# Patient Record
Sex: FEMALE | Employment: PART TIME | URBAN - METROPOLITAN AREA
[De-identification: names, ages, dates, MRNs, and addresses within clinical notes are randomized per-mention and may not be internally consistent; named-entity substitution may affect disease eponyms.]

---

## 2017-08-04 ENCOUNTER — HOSPITAL ENCOUNTER (EMERGENCY)
Facility: CLINIC | Age: 32
Discharge: HOME | End: 2017-08-04
Attending: EMERGENCY MEDICINE | Admitting: EMERGENCY MEDICINE

## 2017-08-04 VITALS
WEIGHT: 137 LBS | DIASTOLIC BLOOD PRESSURE: 72 MMHG | HEIGHT: 66 IN | RESPIRATION RATE: 18 BRPM | BODY MASS INDEX: 22.02 KG/M2 | TEMPERATURE: 97.8 F | HEART RATE: 72 BPM | SYSTOLIC BLOOD PRESSURE: 105 MMHG | OXYGEN SATURATION: 99 %

## 2017-08-04 DIAGNOSIS — Z91.148 NONCOMPLIANCE WITH MEDICATION REGIMEN: ICD-10-CM

## 2017-08-04 DIAGNOSIS — R56.9 SEIZURES (H): ICD-10-CM

## 2017-08-04 LAB
ANION GAP SERPL CALCULATED.3IONS-SCNC: 9 MMOL/L (ref 3–14)
BASOPHILS # BLD AUTO: 0 10E9/L (ref 0–0.2)
BASOPHILS NFR BLD AUTO: 0.2 %
BUN SERPL-MCNC: 11 MG/DL (ref 7–30)
CALCIUM SERPL-MCNC: 8.3 MG/DL (ref 8.5–10.1)
CHLORIDE SERPL-SCNC: 107 MMOL/L (ref 94–109)
CO2 SERPL-SCNC: 23 MMOL/L (ref 20–32)
CREAT SERPL-MCNC: 0.74 MG/DL (ref 0.52–1.04)
DIFFERENTIAL METHOD BLD: ABNORMAL
EOSINOPHIL # BLD AUTO: 0.1 10E9/L (ref 0–0.7)
EOSINOPHIL NFR BLD AUTO: 1.1 %
ERYTHROCYTE [DISTWIDTH] IN BLOOD BY AUTOMATED COUNT: 12.4 % (ref 10–15)
GFR SERPL CREATININE-BSD FRML MDRD: ABNORMAL ML/MIN/1.7M2
GLUCOSE BLDC GLUCOMTR-MCNC: 98 MG/DL (ref 70–99)
GLUCOSE SERPL-MCNC: 87 MG/DL (ref 70–99)
HCT VFR BLD AUTO: 35.5 % (ref 35–47)
HGB BLD-MCNC: 11.6 G/DL (ref 11.7–15.7)
IMM GRANULOCYTES # BLD: 0 10E9/L (ref 0–0.4)
IMM GRANULOCYTES NFR BLD: 0.2 %
LYMPHOCYTES # BLD AUTO: 1.8 10E9/L (ref 0.8–5.3)
LYMPHOCYTES NFR BLD AUTO: 40.4 %
MCH RBC QN AUTO: 28 PG (ref 26.5–33)
MCHC RBC AUTO-ENTMCNC: 32.7 G/DL (ref 31.5–36.5)
MCV RBC AUTO: 86 FL (ref 78–100)
MONOCYTES # BLD AUTO: 0.4 10E9/L (ref 0–1.3)
MONOCYTES NFR BLD AUTO: 9.2 %
NEUTROPHILS # BLD AUTO: 2.2 10E9/L (ref 1.6–8.3)
NEUTROPHILS NFR BLD AUTO: 48.9 %
NRBC # BLD AUTO: 0 10*3/UL
NRBC BLD AUTO-RTO: 0 /100
PLATELET # BLD AUTO: 182 10E9/L (ref 150–450)
POTASSIUM SERPL-SCNC: 3.7 MMOL/L (ref 3.4–5.3)
RBC # BLD AUTO: 4.15 10E12/L (ref 3.8–5.2)
SODIUM SERPL-SCNC: 139 MMOL/L (ref 133–144)
VALPROATE SERPL-MCNC: ABNORMAL MG/L (ref 50–100)
WBC # BLD AUTO: 4.5 10E9/L (ref 4–11)

## 2017-08-04 PROCEDURE — 96374 THER/PROPH/DIAG INJ IV PUSH: CPT

## 2017-08-04 PROCEDURE — 00000146 ZZHCL STATISTIC GLUCOSE BY METER IP

## 2017-08-04 PROCEDURE — 96375 TX/PRO/DX INJ NEW DRUG ADDON: CPT

## 2017-08-04 PROCEDURE — 99284 EMERGENCY DEPT VISIT MOD MDM: CPT | Mod: 25

## 2017-08-04 PROCEDURE — 25000128 H RX IP 250 OP 636: Performed by: EMERGENCY MEDICINE

## 2017-08-04 PROCEDURE — 80164 ASSAY DIPROPYLACETIC ACD TOT: CPT | Performed by: EMERGENCY MEDICINE

## 2017-08-04 PROCEDURE — 80048 BASIC METABOLIC PNL TOTAL CA: CPT | Performed by: EMERGENCY MEDICINE

## 2017-08-04 PROCEDURE — 96361 HYDRATE IV INFUSION ADD-ON: CPT

## 2017-08-04 PROCEDURE — 25000125 ZZHC RX 250: Performed by: EMERGENCY MEDICINE

## 2017-08-04 PROCEDURE — 85025 COMPLETE CBC W/AUTO DIFF WBC: CPT | Performed by: EMERGENCY MEDICINE

## 2017-08-04 RX ORDER — DIVALPROEX SODIUM 500 MG/1
1000 TABLET, EXTENDED RELEASE ORAL DAILY
Qty: 30 TABLET | Refills: 1 | Status: SHIPPED | OUTPATIENT
Start: 2017-08-04 | End: 2018-01-10

## 2017-08-04 RX ORDER — KETOROLAC TROMETHAMINE 15 MG/ML
15 INJECTION, SOLUTION INTRAMUSCULAR; INTRAVENOUS ONCE
Status: COMPLETED | OUTPATIENT
Start: 2017-08-04 | End: 2017-08-04

## 2017-08-04 RX ORDER — DIPHENHYDRAMINE HYDROCHLORIDE 50 MG/ML
25 INJECTION INTRAMUSCULAR; INTRAVENOUS ONCE
Status: COMPLETED | OUTPATIENT
Start: 2017-08-04 | End: 2017-08-04

## 2017-08-04 RX ORDER — METOCLOPRAMIDE HYDROCHLORIDE 5 MG/ML
10 INJECTION INTRAMUSCULAR; INTRAVENOUS ONCE
Status: COMPLETED | OUTPATIENT
Start: 2017-08-04 | End: 2017-08-04

## 2017-08-04 RX ADMIN — METOCLOPRAMIDE 10 MG: 5 INJECTION, SOLUTION INTRAMUSCULAR; INTRAVENOUS at 10:45

## 2017-08-04 RX ADMIN — SODIUM CHLORIDE 1000 ML: 9 INJECTION, SOLUTION INTRAVENOUS at 10:42

## 2017-08-04 RX ADMIN — VALPROATE SODIUM 1000 MG: 100 INJECTION, SOLUTION INTRAVENOUS at 10:57

## 2017-08-04 RX ADMIN — KETOROLAC TROMETHAMINE 15 MG: 15 INJECTION, SOLUTION INTRAMUSCULAR; INTRAVENOUS at 10:42

## 2017-08-04 RX ADMIN — DIPHENHYDRAMINE HYDROCHLORIDE 25 MG: 50 INJECTION, SOLUTION INTRAMUSCULAR; INTRAVENOUS at 10:45

## 2017-08-04 ASSESSMENT — ENCOUNTER SYMPTOMS
CHILLS: 1
ABDOMINAL PAIN: 1
DYSURIA: 0
SEIZURES: 1
SHORTNESS OF BREATH: 0
COUGH: 0
HEADACHES: 1
SORE THROAT: 1

## 2017-08-04 NOTE — ED AVS SNAPSHOT
[facility] Emergency Department    [address]    [facility] [address]    Phone:  [phone]    Fax:  [phone]                                       Roxana Brewster   MRN: [id number]    Department:  [facility] Emergency Department   Date of Visit:  8/4/2017           After Visit Summary Signature Page     I have received my discharge instructions, and my questions have been answered. I have discussed any challenges I see with this plan with the nurse or doctor.    ..........................................................................................................................................  Patient/Patient Representative Signature      ..........................................................................................................................................  Patient Representative Print Name and Relationship to Patient    ..................................................               ................................................  Date                                            Time    ..........................................................................................................................................  Reviewed by Signature/Title    ...................................................              ..............................................  Date                                                            Time

## 2017-08-04 NOTE — DISCHARGE INSTRUCTIONS
1. Take depakote as prescribed.  Please do not miss any doses.  2. Please follow-up with your primary neurologist as needed.  3. Please return to the ED as needed for new or worsening symptoms including repeat seizures, focal weakness, new vision changes, any other concerning symptoms.

## 2017-08-04 NOTE — ED PROVIDER NOTES
"  History     Chief Complaint:  Seizures     HPI   Roxana Brewster is a 32 year old female with a history of seizures and migraines who presents to the emergency department with her mother for evaluation of seizure. Of note, the patient's last seizure was approximately 1 year ago and she normally takes Depakote for this. This morning at 0830, the patient reports having a seizure of an unknown duration. She reports feeling panicked just prior to the onset of the seizure and reports she was lying on her bed during this episode. She reports she has not taken her seizure medication for a week secondary running out of them. The patient also reports having a cold for the past two days, with associated sore throat, body aches and chills, and congestion. She also reports a headache (8/10 sharp, nonrad frontal pain) and photophobia which is consistent with headaches/migraines she's had after seizures in the past.  She also reports mild, non-radiating abdominal pain. She denies fever, cough, shortness of breath, urinary and stool changes, chest pain.    Allergies:  Compazine  Keppra    Medications:    Norco  Divalproex  Fioricet  Depakote    Past Medical History:    Migraines   Seizures    Past Surgical History:    Dilation and curettage    Family History:    No past pertinent family history.    Social History:  Current smoker; 0.5 packs/day  Alcohol use: occasionally  Marital Status:  Single [1]    Review of Systems   Constitutional: Positive for chills.   HENT: Positive for congestion and sore throat.    Respiratory: Negative for cough and shortness of breath.    Cardiovascular: Positive for chest pain.   Gastrointestinal: Positive for abdominal pain.   Genitourinary: Negative for dysuria.   Neurological: Positive for seizures and headaches.   All other systems reviewed and are negative.      Physical Exam   First Vitals:  BP: 111/78  Pulse: 72  Temp: 97.8  F (36.6  C)  Resp: 18  Height: 167.6 cm (5' 6\")  Weight: 62.1 kg " (137 lb)  SpO2: 99 %    Physical Exam   Constitutional: She is oriented to person, place, and time. She appears well-developed and well-nourished.   HENT:   Head: Atraumatic.   Mouth/Throat: Oropharynx is clear and moist.   Eyes: Conjunctivae and EOM are normal. Pupils are equal, round, and reactive to light.   Neck: Normal range of motion. Neck supple.   No c spine tenderness   Cardiovascular: Normal rate, regular rhythm, normal heart sounds and intact distal pulses.    Pulmonary/Chest: Effort normal and breath sounds normal. No stridor.   Abdominal: Soft. She exhibits no distension. There is tenderness (minimal diffuse). There is no rebound and no guarding.   Musculoskeletal: She exhibits no edema.   Neurological: She is alert and oriented to person, place, and time. She displays abnormal reflex (mildly hyperreflexive patellar tendons bilat). No cranial nerve deficit. She exhibits normal muscle tone. Coordination normal.   Skin: Skin is warm and dry. She is not diaphoretic.   Psychiatric: Her behavior is normal. Thought content normal.   Nursing note and vitals reviewed.      Emergency Department Course   Laboratory:  CBC: WBC: 4.5, HGB: 11.6 (L), PLT: 182  BMP: Calcium 8.3 (L), o/w WNL (Creatinine: 0.74)  Glucose by meter: 98  Valproic acid (Depakote Level): <3 Results confirmed by repeat test (L)    Interventions:  1042 Toradol, 15 mg, IV injection  1045 Benadryl 25 mg IV   Reglan 10 mg IV  1057 Depacon 1000 mg IV      Emergency Department Course:  Nursing notes and vitals reviewed. I performed an exam of the patient as documented above.     IV inserted. Medicine administered as documented above. Blood drawn. This was sent to the lab for further testing, results above.    1216 I rechecked the patient and discussed the results of their workup thus far.     Findings and plan explained to the Patient. Patient discharged home with instructions regarding supportive care, medications, and reasons to return. The  importance of close follow-up was reviewed. The patient was prescribed Depakote.    I personally reviewed the laboratory results with the Patient and answered all related questions prior to discharge.       Impression & Plan      Medical Decision Making:  Roxana Brewster is a 32 year old female who presents for a history, history of epilepsy and is on Depakote. The patient presents after a seizure likely secondary to medication noncompliance. The patient reported she has not taken Depakote for the last week. She reports having insurance issues. On further discussion, she reports she does have insurance through her job but just have not gotten the card in the mail. Lab work as noted above is unremarkable. The patient was loaded with Depakote, an IV, and migraine cocktail in the ED. The patient reports resolution of the headache and is feeling much better. No further seizure activity in the ED and the patient requested to go home. The patient was counseled on getting her medications from the pharmacy and taking them as prescribed without missing any doses.  New Rx given for depakote prior to DC. She was given recommended for reasons to return to the ED and recommended to follow up with her PCP.       Diagnosis:    ICD-10-CM    1. Seizures (H) R56.9    2. Noncompliance with medication regimen Z91.14    3. URI    Disposition:  discharged to home    Discharge Medications:  Discharge Medication List as of 8/4/2017 12:45 PM      START taking these medications    Details   !! divalproex (DEPAKOTE ER) 500 MG 24 hr tablet Take 2 tablets (1,000 mg) by mouth daily, Disp-30 tablet, R-1, Local Print       !! - Potential duplicate medications found. Please discuss with provider.          I, Marly Wilson, am serving as a scribe on 8/4/2017 at 10:03 AM to personally document services performed by Domingo Gagnon MD based on my observations and the provider's statements to me.     Marly Wilson  8/4/2017   [facility]  [facility] EMERGENCY DEPARTMENT       Domingo Gagnon MD  08/04/17 0112

## 2017-08-04 NOTE — ED NOTES
ABCs intact. Pt lying in bed and had an unwitnessed seizure about 0830. Pt hx seizures and is on depakote.

## 2017-08-04 NOTE — ED AVS SNAPSHOT
[facility] Emergency Department    [address]    [facility] [address]    Phone:  [phone]    Fax:  [phone]                                       Roxana Brewster   MRN: [id number]    Department:  [facility] Emergency Department   Date of Visit:  8/4/2017           Patient Information     Date Of Birth          1985        Your diagnoses for this visit were:     Seizures (H)     Noncompliance with medication regimen        You were seen by Domingo Gagnon MD.      Follow-up Information     Follow up with Clinic, [facility].    Why:  As needed    Contact information:    [address] [place] [address]  [phone]          Follow up with [facility] Emergency Department.    Specialty:  EMERGENCY MEDICINE    Why:  As needed    Contact information:    [address]  [facility] [id number] [phone]        Discharge Instructions       1. Take depakote as prescribed.  Please do not miss any doses.  2. Please follow-up with your primary neurologist as needed.  3. Please return to the ED as needed for new or worsening symptoms including repeat seizures, focal weakness, new vision changes, any other concerning symptoms.    24 Hour Appointment Hotline       To make an appointment at any [facility] clinic, call [phone] ([phone]). If you don't have a family doctor or clinic, we will help you find one. [facility] clinics are conveniently located to serve the needs of you and your family.             Review of your medicines      CONTINUE these medicines which may have CHANGED, or have new prescriptions. If we are uncertain of the size of tablets/capsules you have at home, strength may be listed as something that might have changed.        Dose / Directions Last dose taken    * divalproex 500 MG EC tablet   Commonly known as:  DEPAKOTE   Dose:  500 mg   What changed:  Another medication with the same name was  added. Make sure you understand how and when to take each.   Quantity:  270 tablet        Take 1 tablet by mouth 2 times daily.   Refills:  1        * divalproex 500 MG 24 hr tablet   Commonly known as:  DEPAKOTE ER   Dose:  500 mg   What changed:  Another medication with the same name was added. Make sure you understand how and when to take each.   Quantity:  30 tablet        Take 1 tablet by mouth 2 times daily.   Refills:  0        * divalproex 500 MG EC tablet   Commonly known as:  DEPAKOTE   Dose:  500 mg   What changed:  Another medication with the same name was added. Make sure you understand how and when to take each.   Quantity:  30 tablet        Take 1 tablet by mouth 2 times daily.   Refills:  0        * divalproex 500 MG 24 hr tablet   Commonly known as:  DEPAKOTE ER   Dose:  1000 mg   What changed:  Another medication with the same name was added. Make sure you understand how and when to take each.   Quantity:  30 tablet        Take 2 tablets (1,000 mg) by mouth daily   Refills:  1        * divalproex 500 MG 24 hr tablet   Commonly known as:  DEPAKOTE ER   Dose:  1000 mg   What changed:  You were already taking a medication with the same name, and this prescription was added. Make sure you understand how and when to take each.   Quantity:  30 tablet        Take 2 tablets (1,000 mg) by mouth daily   Refills:  1        * Notice:  This list has 5 medication(s) that are the same as other medications prescribed for you. Read the directions carefully, and ask your doctor or other care provider to review them with you.      Our records show that you are taking the medicines listed below. If these are incorrect, please call your family doctor or clinic.        Dose / Directions Last dose taken    butalbital-acetaminophen-caffeine -40 MG per tablet   Commonly known as:  FIORICET/ESGIC   Dose:  1 tablet   Quantity:  28 tablet        Take 1 tablet by mouth every 4 hours as needed   Refills:  0         HYDROcodone-acetaminophen 5-325 MG per tablet   Commonly known as:  NORCO   Dose:  1-2 tablet   Quantity:  5 tablet        Take 1-2 tablets by mouth every 4 hours as needed   Refills:  0                Prescriptions were sent or printed at these locations (1 Prescription)                   Other Prescriptions                Printed at Department/Unit printer (1 of 1)         divalproex (DEPAKOTE ER) 500 MG 24 hr tablet                Procedures and tests performed during your visit     Basic metabolic panel    CBC with platelets differential    Glucose by meter    Valproic acid (Depakote level)      Orders Needing Specimen Collection     None      Pending Results     No orders found from 8/2/2017 to 8/5/2017.            Pending Culture Results     No orders found from 8/2/2017 to 8/5/2017.            Pending Results Instructions     If you had any lab results that were not finalized at the time of your Discharge, you can call the ED Lab Result RN at [phone]. You will be contacted by this team for any positive Lab results or changes in treatment. The nurses are available 7 days a week from 10A to 6:30P.  You can leave a message 24 hours per day and they will return your call.        Test Results From Your Hospital Stay        8/4/2017  9:40 AM      Component Results     Component Value Ref Range & Units Status    Glucose 98 70 - 99 mg/dL Final         8/4/2017 10:50 AM      Component Results     Component Value Ref Range & Units Status    WBC 4.5 4.0 - 11.0 10e9/L Final    RBC Count 4.15 3.8 - 5.2 10e12/L Final    Hemoglobin 11.6 (L) 11.7 - 15.7 g/dL Final    Hematocrit 35.5 35.0 - 47.0 % Final    MCV 86 78 - 100 fl Final    MCH 28.0 26.5 - 33.0 pg Final    MCHC 32.7 31.5 - 36.5 g/dL Final    RDW 12.4 10.0 - 15.0 % Final    Platelet Count 182 150 - 450 10e9/L Final    Diff Method Automated Method  Final    % Neutrophils 48.9 % Final    % Lymphocytes 40.4 % Final    % Monocytes 9.2 % Final    % Eosinophils 1.1 %  Final    % Basophils 0.2 % Final    % Immature Granulocytes 0.2 % Final    Nucleated RBCs 0 0 /100 Final    Absolute Neutrophil 2.2 1.6 - 8.3 10e9/L Final    Absolute Lymphocytes 1.8 0.8 - 5.3 10e9/L Final    Absolute Monocytes 0.4 0.0 - 1.3 10e9/L Final    Absolute Eosinophils 0.1 0.0 - 0.7 10e9/L Final    Absolute Basophils 0.0 0.0 - 0.2 10e9/L Final    Abs Immature Granulocytes 0.0 0 - 0.4 10e9/L Final    Absolute Nucleated RBC 0.0  Final         8/4/2017 11:10 AM      Component Results     Component Value Ref Range & Units Status    Sodium 139 133 - 144 mmol/L Final    Potassium 3.7 3.4 - 5.3 mmol/L Final    Chloride 107 94 - 109 mmol/L Final    Carbon Dioxide 23 20 - 32 mmol/L Final    Anion Gap 9 3 - 14 mmol/L Final    Glucose 87 70 - 99 mg/dL Final    Urea Nitrogen 11 7 - 30 mg/dL Final    Creatinine 0.74 0.52 - 1.04 mg/dL Final    GFR Estimate >90  Non  GFR Calc   >60 mL/min/1.7m2 Final    GFR Estimate If Black >90   GFR Calc   >60 mL/min/1.7m2 Final    Calcium 8.3 (L) 8.5 - 10.1 mg/dL Final         8/4/2017 12:05 PM      Component Results     Component Value Ref Range & Units Status    Valproic Acid Level  50 - 100 mg/L Final    <3  Results confirmed by repeat test   (L)                Clinical Quality Measure: Blood Pressure Screening     Your blood pressure was checked while you were in the emergency department today. The last reading we obtained was  BP: 109/79 . Please read the guidelines below about what these numbers mean and what you should do about them.  If your systolic blood pressure (the top number) is less than 120 and your diastolic blood pressure (the bottom number) is less than 80, then your blood pressure is normal. There is nothing more that you need to do about it.  If your systolic blood pressure (the top number) is 120-139 or your diastolic blood pressure (the bottom number) is 80-89, your blood pressure may be higher than it should be. You should have  "your blood pressure rechecked within a year by a primary care provider.  If your systolic blood pressure (the top number) is 140 or greater or your diastolic blood pressure (the bottom number) is 90 or greater, you may have high blood pressure. High blood pressure is treatable, but if left untreated over time it can put you at risk for heart attack, stroke, or kidney failure. You should have your blood pressure rechecked by a primary care provider within the next 4 weeks.  If your provider in the emergency department today gave you specific instructions to follow-up with your doctor or provider even sooner than that, you should follow that instruction and not wait for up to 4 weeks for your follow-up visit.        Thank you for choosing [facility]       Thank you for choosing [facility] for your care. Our goal is always to provide you with excellent care. Hearing back from our patients is one way we can continue to improve our services. Please take a few minutes to complete the written survey that you may receive in the mail after you visit with us. Thank you!        [employer] Information     [facility] lets you send messages to your doctor, view your test results, renew your prescriptions, schedule appointments and more. To sign up, go to [email or web address] . Click on \"Log in\" on the left side of the screen, which will take you to the Welcome page. Then click on \"Sign up Now\" on the right side of the page.     You will be asked to enter the access code listed below, as well as some personal information. Please follow the directions to create your username and password.     Your access code is: [email or web address]  Expires: 2017 12:44 PM     Your access code will  in 90 days. If you need help or a new code, please call your [facility] clinic or [phone].        Care EveryWhere ID     This is your Care EveryWhere ID. This could be used by other organizations to access your [facility] medical records  [id number]   "      Equal Access to Services     TAYA PRASAD : Ryan Busch, isacc crook, aaron reza. So [facility] [phone].    ATENCIÓN: Si habla español, tiene a navas disposición servicios gratuitos de asistencia lingüística. Llame al [phone].    We comply with applicable federal civil rights laws and [place] laws. We do not discriminate on the basis of race, color, national origin, age, disability sex, sexual orientation or gender identity.            After Visit Summary       This is your record. Keep this with you and show to your community pharmacist(s) and doctor(s) at your next visit.

## 2018-01-09 ENCOUNTER — HOSPITAL ENCOUNTER (EMERGENCY)
Facility: CLINIC | Age: 33
Discharge: HOME | End: 2018-01-10
Attending: EMERGENCY MEDICINE | Admitting: EMERGENCY MEDICINE

## 2018-01-09 DIAGNOSIS — R19.7 VOMITING AND DIARRHEA: ICD-10-CM

## 2018-01-09 DIAGNOSIS — R11.10 VOMITING AND DIARRHEA: ICD-10-CM

## 2018-01-09 PROCEDURE — 96374 THER/PROPH/DIAG INJ IV PUSH: CPT | Performed by: EMERGENCY MEDICINE

## 2018-01-09 PROCEDURE — 99284 EMERGENCY DEPT VISIT MOD MDM: CPT | Mod: Z6 | Performed by: EMERGENCY MEDICINE

## 2018-01-09 PROCEDURE — 96361 HYDRATE IV INFUSION ADD-ON: CPT | Performed by: EMERGENCY MEDICINE

## 2018-01-09 PROCEDURE — 25000132 ZZH RX MED GY IP 250 OP 250 PS 637: Performed by: EMERGENCY MEDICINE

## 2018-01-09 PROCEDURE — 99284 EMERGENCY DEPT VISIT MOD MDM: CPT | Mod: 25 | Performed by: EMERGENCY MEDICINE

## 2018-01-09 PROCEDURE — 25000128 H RX IP 250 OP 636: Performed by: EMERGENCY MEDICINE

## 2018-01-09 RX ORDER — ONDANSETRON 2 MG/ML
8 INJECTION INTRAMUSCULAR; INTRAVENOUS ONCE
Status: COMPLETED | OUTPATIENT
Start: 2018-01-09 | End: 2018-01-09

## 2018-01-09 RX ORDER — DIVALPROEX SODIUM 500 MG/1
1000 TABLET, EXTENDED RELEASE ORAL ONCE
Status: COMPLETED | OUTPATIENT
Start: 2018-01-09 | End: 2018-01-09

## 2018-01-09 RX ADMIN — SODIUM CHLORIDE, POTASSIUM CHLORIDE, SODIUM LACTATE AND CALCIUM CHLORIDE 1000 ML: 600; 310; 30; 20 INJECTION, SOLUTION INTRAVENOUS at 21:45

## 2018-01-09 RX ADMIN — ONDANSETRON 8 MG: 2 INJECTION INTRAMUSCULAR; INTRAVENOUS at 21:45

## 2018-01-09 RX ADMIN — DIVALPROEX SODIUM 1000 MG: 500 TABLET, EXTENDED RELEASE ORAL at 23:56

## 2018-01-09 ASSESSMENT — ENCOUNTER SYMPTOMS
NAUSEA: 1
VOMITING: 1
DIARRHEA: 1

## 2018-01-09 NOTE — LETTER
January 10, 2018      To Whom It May Concern:      Roxana SPENCER Burnip was seen in our Emergency Department today, 01/10/18.  I expect her condition to improve over the next 2 days.  She may return to work/school when improved.    Sincerely,        Elliot Beltran MD

## 2018-01-09 NOTE — ED AVS SNAPSHOT
[facility] [place], Emergency Department    [address] [place] [address]    Phone:  [phone]                                       Roxana Brewster   MRN: [id number]    Department:  [facility], Emergency Department   Date of Visit:  1/9/2018           After Visit Summary Signature Page     I have received my discharge instructions, and my questions have been answered. I have discussed any challenges I see with this plan with the nurse or doctor.    ..........................................................................................................................................  Patient/Patient Representative Signature      ..........................................................................................................................................  Patient Representative Print Name and Relationship to Patient    ..................................................               ................................................  Date                                            Time    ..........................................................................................................................................  Reviewed by Signature/Title    ...................................................              ..............................................  Date                                                            Time

## 2018-01-09 NOTE — ED AVS SNAPSHOT
[facility], Emergency Department    [address] [address]    Phone:  [phone]                                       Roxana Brewster   MRN: [id number]    Department:  [facility], Emergency Department   Date of Visit:  1/9/2018           Patient Information     Date Of Birth          1985        Your diagnoses for this visit were:     Vomiting and diarrhea        You were seen by Elliot Beltran MD.        Discharge Instructions       Please make an appointment to follow up with Your Primary Care Provider as needed.    Clear fluids, advance to bland diet as tolerated.    Zofran, as directed, if needed for nausea and/or vomiting.    Return to the emergency department for any problems.      24 Hour Appointment Hotline       To make an appointment at any [place] clinic, call [phone] ([phone]). If you don't have a family doctor or clinic, we will help you find one. [place] clinics are conveniently located to serve the needs of you and your family.             Review of your medicines      START taking        Dose / Directions Last dose taken    ondansetron 4 MG ODT tab   Commonly known as:  ZOFRAN ODT   Dose:  4-8 mg   Quantity:  10 tablet        Take 1-2 tablets (4-8 mg) by mouth every 8 hours as needed for nausea   Refills:  0          CONTINUE these medicines which may have CHANGED, or have new prescriptions. If we are uncertain of the size of tablets/capsules you have at home, strength may be listed as something that might have changed.        Dose / Directions Last dose taken    divalproex 500 MG 24 hr tablet   Commonly known as:  DEPAKOTE ER   Dose:  1000 mg   What changed:  Another medication with the same name was removed. Continue taking this medication, and follow the directions you see here.   Quantity:  60 tablet        Take 2 tablets (1,000 mg) by mouth daily   Refills:  1          Our records show that you are taking the medicines listed below. If  these are incorrect, please call your family doctor or clinic.        Dose / Directions Last dose taken    butalbital-acetaminophen-caffeine -40 MG per tablet   Commonly known as:  FIORICET/ESGIC   Dose:  1 tablet   Quantity:  28 tablet        Take 1 tablet by mouth every 4 hours as needed   Refills:  0        HYDROcodone-acetaminophen 5-325 MG per tablet   Commonly known as:  NORCO   Dose:  1-2 tablet   Quantity:  5 tablet        Take 1-2 tablets by mouth every 4 hours as needed   Refills:  0                Prescriptions were sent or printed at these locations (2 Prescriptions)                   Other Prescriptions                Printed at Department/Unit printer (2 of 2)         divalproex (DEPAKOTE ER) 500 MG 24 hr tablet               ondansetron (ZOFRAN ODT) 4 MG ODT tab                Orders Needing Specimen Collection     None      Pending Results     No orders found for last 3 day(s).            Pending Culture Results     No orders found for last 3 day(s).            Pending Results Instructions     If you had any lab results that were not finalized at the time of your Discharge, you can call the ED Lab Result RN at [phone]. You will be contacted by this team for any positive Lab results or changes in treatment. The nurses are available 7 days a week from 10A to 6:30P.  You can leave a message 24 hours per day and they will return your call.        Thank you for choosing [facility]       Thank you for choosing [facility] for your care. Our goal is always to provide you with excellent care. Hearing back from our patients is one way we can continue to improve our services. Please take a few minutes to complete the written survey that you may receive in the mail after you visit with us. Thank you!        [employer] Information     [employer] lets you send messages to your doctor, view your test results, renew your prescriptions, schedule appointments and more. To sign up, go to [email or web address] .  "Click on \"Log in\" on the left side of the screen, which will take you to the Welcome page. Then click on \"Sign up Now\" on the right side of the page.     You will be asked to enter the access code listed below, as well as some personal information. Please follow the directions to create your username and password.     Your access code is: [email or web address]  Expires: 4/10/2018 12:14 AM     Your access code will  in 90 days. If you need help or a new code, please call your [place] clinic or [phone].        Care EveryWhere ID     This is your Care EveryWhere ID. This could be used by other organizations to access your [place] medical records  [id number]        Equal Access to Services     TAYA PRASAD : Ryan Busch, wahuey crook, sharita hughesaltisha coon, aaron novak. So [facility] [phone].    ATENCIÓN: Si habla español, tiene a navas disposición servicios gratuitos de asistencia lingüística. Llame al [phone].    We comply with applicable federal civil rights laws and [place] laws. We do not discriminate on the basis of race, color, national origin, age, disability, sex, sexual orientation, or gender identity.            After Visit Summary       This is your record. Keep this with you and show to your community pharmacist(s) and doctor(s) at your next visit.                  "

## 2018-01-10 VITALS
SYSTOLIC BLOOD PRESSURE: 121 MMHG | HEIGHT: 66 IN | HEART RATE: 98 BPM | OXYGEN SATURATION: 100 % | DIASTOLIC BLOOD PRESSURE: 77 MMHG | BODY MASS INDEX: 20.57 KG/M2 | RESPIRATION RATE: 18 BRPM | TEMPERATURE: 98 F | WEIGHT: 128 LBS

## 2018-01-10 RX ORDER — DIVALPROEX SODIUM 500 MG/1
1000 TABLET, EXTENDED RELEASE ORAL DAILY
Qty: 60 TABLET | Refills: 1 | Status: SHIPPED | OUTPATIENT
Start: 2018-01-10 | End: 2018-03-13

## 2018-01-10 RX ORDER — ONDANSETRON 4 MG/1
4-8 TABLET, ORALLY DISINTEGRATING ORAL EVERY 8 HOURS PRN
Qty: 10 TABLET | Refills: 0 | Status: SHIPPED | OUTPATIENT
Start: 2018-01-10

## 2018-01-10 NOTE — ED NOTES
Pt presents with c/o N/V/D that started today and cough. She is afebrile. VSS. Pt states that she is also concerned about her epilepsy because she has not taken her seizure meds for about a month. Unsure of last seizure. She states she has not taken her medication d/t not having insurance.

## 2018-01-10 NOTE — DISCHARGE INSTRUCTIONS
Please make an appointment to follow up with Your Primary Care Provider as needed.    Clear fluids, advance to bland diet as tolerated.    Zofran, as directed, if needed for nausea and/or vomiting.    Return to the emergency department for any problems.

## 2018-01-10 NOTE — ED PROVIDER NOTES
[place] EMERGENCY DEPARTMENT ([facility])  1/09/18 ED 22  9:22 PM   History     Chief Complaint   Patient presents with     Nausea, Vomiting, & Diarrhea     The history is provided by the patient and medical records.     Roxana Brewster is a 32 year old female who presents with flu symptoms nausea, vomiting, diarrhea that started yesterday as well as missing seizure meds for 1 month.  She has a history of seizures started at age 13 and is on Depakote.  Per [facility] records, her last seizure was in May 2017.  Patient notes recent contacts sick individuals including her mother was recently hospitalized for pneumonia and her nephew who has strep and flu with nausea, vomiting, diarrhea.  Patient herself developed nausea, vomiting, diarrhea yesterday.  Here she feels nauseous and has still been vomiting. Her mouth feels very dry at this time.  She is concerned about her ability to keep down medications.  Patient is out of her seizure medications and has not had any in a month, and asks if she can have a refill. She had diarrhea x 2 today. No bloody stools.    I have reviewed the Medications, Allergies, Past Medical and Surgical History, and Social History in the [facility] system.    PAST MEDICAL HISTORY:   Past Medical History:   Diagnosis Date     Migraines      Seizures (H)        PAST SURGICAL HISTORY:   Past Surgical History:   Procedure Laterality Date     DILATION AND CURETTAGE      one year ago       FAMILY HISTORY: No family history on file.    SOCIAL HISTORY:   Social History   Substance Use Topics     Smoking status: Current Some Day Smoker     Packs/day: 0.50     Smokeless tobacco: Never Used      Comment: quit about 1 month ago     Alcohol use Yes      Comment: 2 drinks/wk       Discharge Medication List as of 1/10/2018 12:14 AM      START taking these medications    Details   ondansetron (ZOFRAN ODT) 4 MG ODT tab Take 1-2 tablets (4-8 mg) by mouth every 8 hours as needed for nausea,  "Disp-10 tablet, R-0, Local Print         CONTINUE these medications which have CHANGED    Details   divalproex (DEPAKOTE ER) 500 MG 24 hr tablet Take 2 tablets (1,000 mg) by mouth daily, Disp-60 tablet, R-1, Local Print         CONTINUE these medications which have NOT CHANGED    Details   HYDROcodone-acetaminophen (NORCO) 5-325 MG per tablet Take 1-2 tablets by mouth every 4 hours as needed, Disp-5 tablet, R-0, Local Print      butalbital-acetaminophen-caffeine (FIORICET, ESGIC) -40 MG per tablet Take 1 tablet by mouth every 4 hours as needed, Disp-28 tablet, R-0, Local Print         STOP taking these medications       divalproex (DEPAKOTE) 500 MG EC tablet Comments:   Reason for Stopping:         divalproex (DEPAKOTE) 500 MG EC tablet Comments:   Reason for Stopping:                  Allergies   Allergen Reactions     Compazine [Prochlorperazine]      Keppra [Levetiracetam]        Review of Systems   Constitutional: Negative for fever.   Respiratory: Negative for shortness of breath.    Gastrointestinal: Positive for diarrhea, nausea and vomiting. Negative for abdominal pain and blood in stool.   Genitourinary: Negative for decreased urine volume.   Neurological: Negative for seizures.   All other systems reviewed and are negative.      Physical Exam   BP: 120/72  Pulse: 114  Temp: 98  F (36.7  C)  Resp: 16  Height: 167.6 cm (5' 6\")  Weight: 58.1 kg (128 lb)  SpO2: 100 %      Physical Exam   Constitutional: She is oriented to person, place, and time. She appears well-developed and well-nourished.  Non-toxic appearance. She does not appear ill. No distress.   HENT:   Head: Normocephalic and atraumatic.   Mouth/Throat: Oropharynx is clear and moist. No oropharyngeal exudate.   Eyes: Conjunctivae and EOM are normal. Pupils are equal, round, and reactive to light. No scleral icterus.   Neck: Normal range of motion. Neck supple. No JVD present. No tracheal deviation present. No thyromegaly present. "   Cardiovascular: Regular rhythm, normal heart sounds and intact distal pulses.  Tachycardia present.  Exam reveals no gallop and no friction rub.    No murmur heard.  Pulmonary/Chest: Effort normal and breath sounds normal. No respiratory distress.   Abdominal: Soft. Bowel sounds are normal. She exhibits no distension and no mass. There is no tenderness. There is no rigidity, no rebound, no guarding and no CVA tenderness.   Musculoskeletal: Normal range of motion. She exhibits no edema or tenderness.   Lymphadenopathy:     She has no cervical adenopathy.   Neurological: She is alert and oriented to person, place, and time. She has normal strength. No cranial nerve deficit or sensory deficit.   Skin: Skin is warm and dry. No rash noted. No erythema. No pallor.   Psychiatric: She has a normal mood and affect. Her behavior is normal.   Nursing note and vitals reviewed.      ED Course     ED Course     Procedures                 Assessments & Plan (with Medical Decision Making)     This patient presented to the emergency department with vomiting and diarrhea which began yesterday.  She was afebrile and had a benign abdominal exam, decreasing suspicion for acute appendicitis or other more serious intra-abdominal processes.  Symptoms most likely secondary to a viral gastroenteritis.  She was hydrated as she did appear to be somewhat dehydrated with mild tachycardia and tacky oral mucosa.  She was provided with Zofran and felt much improved after a liter of IV fluid.  She was able to tolerate oral intake and repeat abdominal exam continued to be benign.  She was given her oral dose of antiepileptics and was provided with prescriptions for Zofran and Depakote at discharge.  She was discharged in good condition.    I have reviewed the nursing notes.    I have reviewed the findings, diagnosis, plan and need for follow up with the patient.    Discharge Medication List as of 1/10/2018 12:14 AM      START taking these  medications    Details   ondansetron (ZOFRAN ODT) 4 MG ODT tab Take 1-2 tablets (4-8 mg) by mouth every 8 hours as needed for nausea, Disp-10 tablet, R-0, Local Print             Final diagnoses:   Vomiting and diarrhea     ITracey, am serving as a trained medical scribe to document services personally performed by Elliot Beltran MD based on the provider's statements to me on January 9, 2018.  This document has been checked and approved by the attending provider.    IElliot MD, was physically present and have reviewed and verified the accuracy of this note documented by Tracey Mckeon, medical scribe.       1/9/2018   [facility], [place], EMERGENCY DEPARTMENT     Elliot Beltran MD  01/11/18 0824

## 2018-01-11 ASSESSMENT — ENCOUNTER SYMPTOMS
BLOOD IN STOOL: 0
ABDOMINAL PAIN: 0
SHORTNESS OF BREATH: 0
FEVER: 0
SEIZURES: 0

## 2018-03-13 ENCOUNTER — APPOINTMENT (OUTPATIENT)
Dept: GENERAL RADIOLOGY | Facility: CLINIC | Age: 33
End: 2018-03-13
Attending: INTERNAL MEDICINE

## 2018-03-13 ENCOUNTER — HOSPITAL ENCOUNTER (EMERGENCY)
Facility: CLINIC | Age: 33
Discharge: HOME | End: 2018-03-13
Attending: INTERNAL MEDICINE | Admitting: INTERNAL MEDICINE

## 2018-03-13 VITALS
HEIGHT: 66 IN | DIASTOLIC BLOOD PRESSURE: 75 MMHG | RESPIRATION RATE: 16 BRPM | HEART RATE: 69 BPM | TEMPERATURE: 98.2 F | OXYGEN SATURATION: 99 % | SYSTOLIC BLOOD PRESSURE: 118 MMHG

## 2018-03-13 DIAGNOSIS — R10.13 ABDOMINAL PAIN, EPIGASTRIC: ICD-10-CM

## 2018-03-13 DIAGNOSIS — G40.909 SEIZURE DISORDER (H): ICD-10-CM

## 2018-03-13 LAB
ALBUMIN SERPL-MCNC: 3.7 G/DL (ref 3.4–5)
ALBUMIN UR-MCNC: 10 MG/DL
ALP SERPL-CCNC: 68 U/L (ref 40–150)
ALT SERPL W P-5'-P-CCNC: 18 U/L (ref 0–50)
AMPHETAMINES UR QL SCN: NEGATIVE
ANION GAP SERPL CALCULATED.3IONS-SCNC: 7 MMOL/L (ref 3–14)
APPEARANCE UR: CLEAR
AST SERPL W P-5'-P-CCNC: 18 U/L (ref 0–45)
BARBITURATES UR QL: NEGATIVE
BASOPHILS # BLD AUTO: 0 10E9/L (ref 0–0.2)
BASOPHILS NFR BLD AUTO: 0 %
BENZODIAZ UR QL: NEGATIVE
BILIRUB SERPL-MCNC: 0.9 MG/DL (ref 0.2–1.3)
BILIRUB UR QL STRIP: NEGATIVE
BUN SERPL-MCNC: 14 MG/DL (ref 7–30)
CALCIUM SERPL-MCNC: 8.5 MG/DL (ref 8.5–10.1)
CANNABINOIDS UR QL SCN: POSITIVE
CHLORIDE SERPL-SCNC: 107 MMOL/L (ref 94–109)
CO2 SERPL-SCNC: 24 MMOL/L (ref 20–32)
COCAINE UR QL: NEGATIVE
COLOR UR AUTO: YELLOW
CREAT SERPL-MCNC: 0.78 MG/DL (ref 0.52–1.04)
CRP SERPL-MCNC: <2.9 MG/L (ref 0–8)
DIFFERENTIAL METHOD BLD: ABNORMAL
EOSINOPHIL # BLD AUTO: 0 10E9/L (ref 0–0.7)
EOSINOPHIL NFR BLD AUTO: 0.7 %
ERYTHROCYTE [DISTWIDTH] IN BLOOD BY AUTOMATED COUNT: 13 % (ref 10–15)
ETHANOL UR QL SCN: NEGATIVE
GFR SERPL CREATININE-BSD FRML MDRD: 85 ML/MIN/1.7M2
GLUCOSE SERPL-MCNC: 76 MG/DL (ref 70–99)
GLUCOSE UR STRIP-MCNC: NEGATIVE MG/DL
HCG UR QL: NEGATIVE
HCT VFR BLD AUTO: 35.1 % (ref 35–47)
HGB BLD-MCNC: 11.4 G/DL (ref 11.7–15.7)
HGB UR QL STRIP: NEGATIVE
IMM GRANULOCYTES # BLD: 0 10E9/L (ref 0–0.4)
IMM GRANULOCYTES NFR BLD: 0.2 %
INTERNAL QC OK POCT: YES
INTERPRETATION ECG - MUSE: NORMAL
KETONES UR STRIP-MCNC: NEGATIVE MG/DL
LEUKOCYTE ESTERASE UR QL STRIP: ABNORMAL
LIPASE SERPL-CCNC: 279 U/L (ref 73–393)
LYMPHOCYTES # BLD AUTO: 1.5 10E9/L (ref 0.8–5.3)
LYMPHOCYTES NFR BLD AUTO: 32 %
MCH RBC QN AUTO: 27.5 PG (ref 26.5–33)
MCHC RBC AUTO-ENTMCNC: 32.5 G/DL (ref 31.5–36.5)
MCV RBC AUTO: 85 FL (ref 78–100)
MONOCYTES # BLD AUTO: 0.2 10E9/L (ref 0–1.3)
MONOCYTES NFR BLD AUTO: 5.2 %
MUCOUS THREADS #/AREA URNS LPF: PRESENT /LPF
NEUTROPHILS # BLD AUTO: 2.9 10E9/L (ref 1.6–8.3)
NEUTROPHILS NFR BLD AUTO: 61.9 %
NITRATE UR QL: NEGATIVE
NRBC # BLD AUTO: 0 10*3/UL
NRBC BLD AUTO-RTO: 0 /100
NT-PROBNP SERPL-MCNC: 27 PG/ML (ref 0–450)
OPIATES UR QL SCN: NEGATIVE
PH UR STRIP: 7.5 PH (ref 5–7)
PLATELET # BLD AUTO: 216 10E9/L (ref 150–450)
POTASSIUM SERPL-SCNC: 3.6 MMOL/L (ref 3.4–5.3)
PROT SERPL-MCNC: 8 G/DL (ref 6.8–8.8)
RBC # BLD AUTO: 4.14 10E12/L (ref 3.8–5.2)
RBC #/AREA URNS AUTO: 1 /HPF (ref 0–2)
SODIUM SERPL-SCNC: 139 MMOL/L (ref 133–144)
SOURCE: ABNORMAL
SP GR UR STRIP: 1.02 (ref 1–1.03)
SQUAMOUS #/AREA URNS AUTO: 5 /HPF (ref 0–1)
TRANS CELLS #/AREA URNS HPF: <1 /HPF (ref 0–1)
TROPONIN I SERPL-MCNC: <0.015 UG/L (ref 0–0.04)
UROBILINOGEN UR STRIP-MCNC: 2 MG/DL (ref 0–2)
VALPROATE SERPL-MCNC: <3 MG/L (ref 50–100)
WBC # BLD AUTO: 4.6 10E9/L (ref 4–11)
WBC #/AREA URNS AUTO: 3 /HPF (ref 0–5)

## 2018-03-13 PROCEDURE — 96366 THER/PROPH/DIAG IV INF ADDON: CPT | Performed by: INTERNAL MEDICINE

## 2018-03-13 PROCEDURE — 80053 COMPREHEN METABOLIC PANEL: CPT | Performed by: EMERGENCY MEDICINE

## 2018-03-13 PROCEDURE — 86140 C-REACTIVE PROTEIN: CPT | Performed by: EMERGENCY MEDICINE

## 2018-03-13 PROCEDURE — 80307 DRUG TEST PRSMV CHEM ANLYZR: CPT | Performed by: EMERGENCY MEDICINE

## 2018-03-13 PROCEDURE — 81025 URINE PREGNANCY TEST: CPT | Performed by: INTERNAL MEDICINE

## 2018-03-13 PROCEDURE — 83880 ASSAY OF NATRIURETIC PEPTIDE: CPT | Performed by: EMERGENCY MEDICINE

## 2018-03-13 PROCEDURE — 83690 ASSAY OF LIPASE: CPT | Performed by: EMERGENCY MEDICINE

## 2018-03-13 PROCEDURE — 81001 URINALYSIS AUTO W/SCOPE: CPT | Performed by: EMERGENCY MEDICINE

## 2018-03-13 PROCEDURE — 80320 DRUG SCREEN QUANTALCOHOLS: CPT | Performed by: EMERGENCY MEDICINE

## 2018-03-13 PROCEDURE — 93005 ELECTROCARDIOGRAM TRACING: CPT | Performed by: INTERNAL MEDICINE

## 2018-03-13 PROCEDURE — 85025 COMPLETE CBC W/AUTO DIFF WBC: CPT | Performed by: EMERGENCY MEDICINE

## 2018-03-13 PROCEDURE — 25000125 ZZHC RX 250: Performed by: INTERNAL MEDICINE

## 2018-03-13 PROCEDURE — 96375 TX/PRO/DX INJ NEW DRUG ADDON: CPT | Performed by: INTERNAL MEDICINE

## 2018-03-13 PROCEDURE — 71045 X-RAY EXAM CHEST 1 VIEW: CPT

## 2018-03-13 PROCEDURE — 99285 EMERGENCY DEPT VISIT HI MDM: CPT | Mod: 25 | Performed by: INTERNAL MEDICINE

## 2018-03-13 PROCEDURE — 96361 HYDRATE IV INFUSION ADD-ON: CPT | Performed by: INTERNAL MEDICINE

## 2018-03-13 PROCEDURE — 80164 ASSAY DIPROPYLACETIC ACD TOT: CPT | Performed by: EMERGENCY MEDICINE

## 2018-03-13 PROCEDURE — 96365 THER/PROPH/DIAG IV INF INIT: CPT | Performed by: INTERNAL MEDICINE

## 2018-03-13 PROCEDURE — 84484 ASSAY OF TROPONIN QUANT: CPT | Performed by: EMERGENCY MEDICINE

## 2018-03-13 PROCEDURE — 25000128 H RX IP 250 OP 636: Performed by: INTERNAL MEDICINE

## 2018-03-13 PROCEDURE — 99284 EMERGENCY DEPT VISIT MOD MDM: CPT | Mod: 25 | Performed by: INTERNAL MEDICINE

## 2018-03-13 PROCEDURE — 93010 ELECTROCARDIOGRAM REPORT: CPT | Mod: Z6 | Performed by: INTERNAL MEDICINE

## 2018-03-13 RX ORDER — DIVALPROEX SODIUM 500 MG/1
1000 TABLET, EXTENDED RELEASE ORAL DAILY
Qty: 30 TABLET | Refills: 0 | Status: SHIPPED | OUTPATIENT
Start: 2018-03-13

## 2018-03-13 RX ADMIN — SODIUM CHLORIDE 1000 ML: 9 INJECTION, SOLUTION INTRAVENOUS at 08:08

## 2018-03-13 RX ADMIN — VALPROATE SODIUM 1000 MG: 100 INJECTION, SOLUTION INTRAVENOUS at 09:16

## 2018-03-13 RX ADMIN — RANITIDINE HYDROCHLORIDE 50 MG: 25 INJECTION INTRAMUSCULAR; INTRAVENOUS at 08:08

## 2018-03-13 ASSESSMENT — ENCOUNTER SYMPTOMS
FEVER: 0
DIARRHEA: 0
NAUSEA: 1
DIZZINESS: 0
ABDOMINAL PAIN: 1
SHORTNESS OF BREATH: 0
COUGH: 1
VOMITING: 1

## 2018-03-13 NOTE — DISCHARGE INSTRUCTIONS
Please make an appointment to follow up with Your Neurologist and Your Primary Care Provider as soon as possible even if entirely better.

## 2018-03-13 NOTE — ED AVS SNAPSHOT
[facility], [place], Emergency Department    [address] [place] [address]    Phone:  [phone]                                       Roxana Brewster   MRN: [id number]    Department:  [facility], Emergency Department   Date of Visit:  3/13/2018           After Visit Summary Signature Page     I have received my discharge instructions, and my questions have been answered. I have discussed any challenges I see with this plan with the nurse or doctor.    ..........................................................................................................................................  Patient/Patient Representative Signature      ..........................................................................................................................................  Patient Representative Print Name and Relationship to Patient    ..................................................               ................................................  Date                                            Time    ..........................................................................................................................................  Reviewed by Signature/Title    ...................................................              ..............................................  Date                                                            Time

## 2018-03-13 NOTE — ED NOTES
Patient presents to triage c/o epigastric discomfort that radiates up to chest and throat. This discomfort started approximately one week ago and has been intermittent. It is worse when she coughs, but not when she eats. She also has not taken her Depakote for two weeks because her insurance has changed and can't get the prescription filled. Unsure of when her last seizure was, she says she may have had one when she's been asleep and hasn't known it.

## 2018-03-13 NOTE — ED AVS SNAPSHOT
[facility], Emergency Department    [address] [address]    Phone:  [phone]                                       Roxana Brewster   MRN: [id number]    Department:  [facility], Emergency Department   Date of Visit:  3/13/2018           Patient Information     Date Of Birth          1985        Your diagnoses for this visit were:     Abdominal pain, epigastric     Seizure disorder (H)        You were seen by Paul Castillo MD.        Discharge Instructions       Please make an appointment to follow up with Your Neurologist and Your Primary Care Provider as soon as possible even if entirely better.     Discharge References/Attachments     EPIGASTRIC PAIN (UNCERTAIN CAUSE) (ENGLISH)      24 Hour Appointment Hotline       To make an appointment at any [facility] clinic, call [phone] ([phone]). If you don't have a family doctor or clinic, we will help you find one. [facility] clinics are conveniently located to serve the needs of you and your family.             Review of your medicines      START taking        Dose / Directions Last dose taken    ranitidine 150 MG tablet   Commonly known as:  ZANTAC   Dose:  150 mg   Quantity:  30 tablet        Take 1 tablet (150 mg) by mouth 2 times daily for 15 days   Refills:  0          Our records show that you are taking the medicines listed below. If these are incorrect, please call your family doctor or clinic.        Dose / Directions Last dose taken    butalbital-acetaminophen-caffeine -40 MG per tablet   Commonly known as:  FIORICET/ESGIC   Dose:  1 tablet   Quantity:  28 tablet        Take 1 tablet by mouth every 4 hours as needed   Refills:  0        divalproex sodium extended-release 500 MG 24 hr tablet   Commonly known as:  DEPAKOTE ER   Dose:  1000 mg   Quantity:  30 tablet        Take 2 tablets (1,000 mg) by mouth daily   Refills:  0        HYDROcodone-acetaminophen 5-325 MG per tablet   Commonly known as:  NORCO    Dose:  1-2 tablet   Quantity:  5 tablet        Take 1-2 tablets by mouth every 4 hours as needed   Refills:  0        ondansetron 4 MG ODT tab   Commonly known as:  ZOFRAN ODT   Dose:  4-8 mg   Quantity:  10 tablet        Take 1-2 tablets (4-8 mg) by mouth every 8 hours as needed for nausea   Refills:  0                Prescriptions were sent or printed at these locations (2 Prescriptions)                   Other Prescriptions                Printed at Department/Unit printer (2 of 2)         divalproex sodium extended-release (DEPAKOTE ER) 500 MG 24 hr tablet               ranitidine (ZANTAC) 150 MG tablet                Procedures and tests performed during your visit     CBC with platelets differential    CRP inflammation    Cardiac Continuous Monitoring    Comprehensive metabolic panel    Drug abuse screen 6 urine (chem dep)    EKG 12-lead, tracing only    Lipase    May feed patient    Nt probnp inpatient    Routine UA with microscopic    Troponin I    Valproic acid (Depakote level)    XR Chest Port 1 View    hCG qual urine POCT      Orders Needing Specimen Collection     None      Pending Results     Date and Time Order Name Status Description    3/13/2018 0716 XR Chest Port 1 View Preliminary     3/13/2018 0716 EKG 12-lead, tracing only Preliminary             Pending Culture Results     No orders found from 3/11/2018 to 3/14/2018.            Pending Results Instructions     If you had any lab results that were not finalized at the time of your Discharge, you can call the ED Lab Result RN at [phone]. You will be contacted by this team for any positive Lab results or changes in treatment. The nurses are available 7 days a week from 10A to 6:30P.  You can leave a message 24 hours per day and they will return your call.        Thank you for choosing [facility]       Thank you for choosing [facility] for your care. Our goal is always to provide you with excellent care. Hearing back from our patients is one way  "we can continue to improve our services. Please take a few minutes to complete the written survey that you may receive in the mail after you visit with us. Thank you!        Get [employer] Information     [employer] lets you send messages to your doctor, view your test results, renew your prescriptions, schedule appointments and more. To sign up, go to [email or web address] . Click on \"Log in\" on the left side of the screen, which will take you to the Welcome page. Then click on \"Sign up Now\" on the right side of the page.     You will be asked to enter the access code listed below, as well as some personal information. Please follow the directions to create your username and password.     Your access code is: [email or web address]  Expires: 4/10/2018  1:14 AM     Your access code will  in 90 days. If you need help or a new code, please call your [place] clinic or [phone].        Care EveryWhere ID     This is your Care EveryWhere ID. This could be used by other organizations to access your [place] medical records  [id number]        Equal Access to Services     TAYA PRASAD : Hadnash Busch, isacc crook, aaron reza. So [facility] [phone].    ATENCIÓN: Si habla español, tiene a navas disposición servicios gratuitos de asistencia lingüística. [place] al [phone].    We comply with applicable federal civil rights laws and [place] laws. We do not discriminate on the basis of race, color, national origin, age, disability, sex, sexual orientation, or gender identity.            After Visit Summary       This is your record. Keep this with you and show to your community pharmacist(s) and doctor(s) at your next visit.                  "

## 2018-03-13 NOTE — PROGRESS NOTES
Emergency Social Work Services Note    Date of  Intervention: 03/13/18  Last Emergency Department Visit:  1/9/18  Care Plan:  None  Collaborated with:  Pt, ED MD and Pharmacy    Data:  SW consult per MD re: pt has not been filling her seizure medication due to cost. Writer reviewed chart and met w/ pt in ED room 8. Pt reports that she has insurance and it was costing her $160 at each refill. Pt is working and has insurance through her employer. Writer collaborated w/ pharmacy and they were able to run the prescription and the cost was $63 for 60 pills when run through pt's insurance. Writer informed pt of this and she reported no concerns with filling the script, but she wanted to take the script with her. Pt reports she follows up w/ Neurology at [facility].     Intervention:  D/C Planning     Assessment:  Pt is receptive to SW visit and engaged in discussion. Pt does not appear motivated to obtain her medications even though she has indicated that finances are not an issue and writer offered to have prescription filled here, prior to d/c.     Plan:    Anticipated Disposition:  Home, no needs identified    Barriers to d/c plan:  none    Follow Up:  No further SW needs identified.

## 2018-03-13 NOTE — ED PROVIDER NOTES
History     Chief Complaint   Patient presents with     Abdominal Pain     HPI  Roxana Brewster is a 33 year old female with a history of seizures, on Depakote, who presents with 1 week history of upper abdominal discomfort and burping sensation followed by a cough. She denies any shortness of breath or dizziness. She complains of some nausea and vomiting with this. She also denies any fever or diarrhea. She has not had anything like this in the past. She has also reported that she has not taken Depakote for the last 2 months because she ran out and has had difficulty with insurance.     PAST MEDICAL HISTORY:   Past Medical History:   Diagnosis Date     Migraines      Seizures (H)        PAST SURGICAL HISTORY:   Past Surgical History:   Procedure Laterality Date     DILATION AND CURETTAGE      one year ago       FAMILY HISTORY: No family history on file.    SOCIAL HISTORY:   Social History   Substance Use Topics     Smoking status: Current Some Day Smoker     Packs/day: 0.50     Smokeless tobacco: Never Used      Comment: quit about 1 month ago     Alcohol use Yes      Comment: 2 drinks/wk     No current facility-administered medications for this encounter.      Current Outpatient Prescriptions   Medication     divalproex sodium extended-release (DEPAKOTE ER) 500 MG 24 hr tablet     ranitidine (ZANTAC) 150 MG tablet     ondansetron (ZOFRAN ODT) 4 MG ODT tab     [DISCONTINUED] divalproex (DEPAKOTE ER) 500 MG 24 hr tablet     HYDROcodone-acetaminophen (NORCO) 5-325 MG per tablet     butalbital-acetaminophen-caffeine (FIORICET, ESGIC) -40 MG per tablet        Allergies   Allergen Reactions     Compazine [Prochlorperazine]      Keppra [Levetiracetam]       I have reviewed the Medications, Allergies, Past Medical and Surgical History, and Social History in the Epic system.    Review of Systems   Constitutional: Negative for fever.   Respiratory: Positive for cough. Negative for shortness of breath.   "  Gastrointestinal: Positive for abdominal pain, nausea and vomiting. Negative for diarrhea.   Neurological: Negative for dizziness.   All other systems reviewed and are negative.      Physical Exam   BP: 111/60  Pulse: 90  Heart Rate: 71  Temp: 98.2  F (36.8  C)  Resp: 18  Height: 167.6 cm (5' 6\")  SpO2: 100 %      Physical Exam   Constitutional: No distress.   HENT:   Head: Atraumatic.   Mouth/Throat: Oropharynx is clear and moist. No oropharyngeal exudate.   Eyes: Pupils are equal, round, and reactive to light. No scleral icterus.   Neck: Neck supple. No JVD present.   Cardiovascular: Normal rate, normal heart sounds and intact distal pulses.  Exam reveals no gallop and no friction rub.    No murmur heard.  Pulmonary/Chest: Effort normal and breath sounds normal. No respiratory distress. She has no wheezes. She has no rales. She exhibits no tenderness.   Abdominal: Soft. Bowel sounds are normal. There is no hepatosplenomegaly. There is tenderness in the epigastric area. There is no rigidity, no rebound, no guarding, no CVA tenderness, no tenderness at McBurney's point and negative Lassiter's sign. No hernia.       Musculoskeletal: She exhibits no edema or tenderness.   Skin: Skin is warm. No rash noted. She is not diaphoretic.       ED Course     ED Course     Procedures             EKG Interpretation:      Interpreted by Paul Castillo MD  Time reviewed: 7:24 AM  Symptoms at time of EKG: abdominal pain   Rhythm: normal sinus   Rate: 73 bpm  Axis: Rightward Axis  Ectopy: none  Conduction: normal  ST Segments/ T Waves: No ST-T wave changes  Q Waves: none  Comparison to prior: Unchanged from 1/11/1995    Clinical Impression: Normal, no acute changes    Results for orders placed or performed during the hospital encounter of 03/13/18 (from the past 24 hour(s))   CBC with platelets differential     Status: Abnormal    Collection Time: 03/13/18  6:15 AM   Result Value Ref Range    WBC 4.6 4.0 - 11.0 10e9/L    RBC Count " 4.14 3.8 - 5.2 10e12/L    Hemoglobin 11.4 (L) 11.7 - 15.7 g/dL    Hematocrit 35.1 35.0 - 47.0 %    MCV 85 78 - 100 fl    MCH 27.5 26.5 - 33.0 pg    MCHC 32.5 31.5 - 36.5 g/dL    RDW 13.0 10.0 - 15.0 %    Platelet Count 216 150 - 450 10e9/L    Diff Method Automated Method     % Neutrophils 61.9 %    % Lymphocytes 32.0 %    % Monocytes 5.2 %    % Eosinophils 0.7 %    % Basophils 0.0 %    % Immature Granulocytes 0.2 %    Nucleated RBCs 0 0 /100    Absolute Neutrophil 2.9 1.6 - 8.3 10e9/L    Absolute Lymphocytes 1.5 0.8 - 5.3 10e9/L    Absolute Monocytes 0.2 0.0 - 1.3 10e9/L    Absolute Eosinophils 0.0 0.0 - 0.7 10e9/L    Absolute Basophils 0.0 0.0 - 0.2 10e9/L    Abs Immature Granulocytes 0.0 0 - 0.4 10e9/L    Absolute Nucleated RBC 0.0    Comprehensive metabolic panel     Status: None    Collection Time: 03/13/18  6:15 AM   Result Value Ref Range    Sodium 139 133 - 144 mmol/L    Potassium 3.6 3.4 - 5.3 mmol/L    Chloride 107 94 - 109 mmol/L    Carbon Dioxide 24 20 - 32 mmol/L    Anion Gap 7 3 - 14 mmol/L    Glucose 76 70 - 99 mg/dL    Urea Nitrogen 14 7 - 30 mg/dL    Creatinine 0.78 0.52 - 1.04 mg/dL    GFR Estimate 85 >60 mL/min/1.7m2    GFR Estimate If Black >90 >60 mL/min/1.7m2    Calcium 8.5 8.5 - 10.1 mg/dL    Bilirubin Total 0.9 0.2 - 1.3 mg/dL    Albumin 3.7 3.4 - 5.0 g/dL    Protein Total 8.0 6.8 - 8.8 g/dL    Alkaline Phosphatase 68 40 - 150 U/L    ALT 18 0 - 50 U/L    AST 18 0 - 45 U/L   Valproic acid (Depakote level)     Status: Abnormal    Collection Time: 03/13/18  6:15 AM   Result Value Ref Range    Valproic Acid Level <3 (L) 50 - 100 mg/L   CRP inflammation     Status: None    Collection Time: 03/13/18  6:15 AM   Result Value Ref Range    CRP Inflammation <2.9 0.0 - 8.0 mg/L   Lipase     Status: None    Collection Time: 03/13/18  6:15 AM   Result Value Ref Range    Lipase 279 73 - 393 U/L   Nt probnp inpatient     Status: None    Collection Time: 03/13/18  6:15 AM   Result Value Ref Range    N-Terminal  Pro BNP Inpatient 27 0 - 450 pg/mL   Troponin I     Status: None    Collection Time: 03/13/18  6:15 AM   Result Value Ref Range    Troponin I ES <0.015 0.000 - 0.045 ug/L   EKG 12-lead, tracing only     Status: None    Collection Time: 03/13/18  7:23 AM   Result Value Ref Range    Interpretation ECG Click View Image link to view waveform and result    XR Chest Port 1 View     Status: None    Collection Time: 03/13/18  7:54 AM    Narrative    Study: XR CHEST PORT 1 VW 3/13/2018 7:54 AM    Comparison: None    History: cp    Findings:   AP radiograph of the chest is obtained and reviewed. No pneumothorax.  No pleural effusions. No focal airspace opacities. Cardiac silhouette  is within normal limits. Mediastinum demonstrates irregular contour  along the mid border on the right. Trachea is midline. Osseous  structures are within normal limits. Upper abdomen is unremarkable.      Impression    Impression:   1. No acute cardiopulmonary findings.  2. Irregular right mediastinal contour may represent vascular anatomic  variant. Recommend lateral radiograph versus follow-up outpatient MRI  to better evaluate.    I have personally reviewed the examination and initial interpretation  and I agree with the findings.    RANDALL GONZALEZ MD   hCG qual urine POCT     Status: Normal    Collection Time: 03/13/18  8:15 AM   Result Value Ref Range    HCG Qual Urine Negative neg    Internal QC OK Yes    Routine UA with microscopic     Status: Abnormal    Collection Time: 03/13/18  8:16 AM   Result Value Ref Range    Color Urine Yellow     Appearance Urine Clear     Glucose Urine Negative NEG^Negative mg/dL    Bilirubin Urine Negative NEG^Negative    Ketones Urine Negative NEG^Negative mg/dL    Specific Gravity Urine 1.023 1.003 - 1.035    Blood Urine Negative NEG^Negative    pH Urine 7.5 (H) 5.0 - 7.0 pH    Protein Albumin Urine 10 (A) NEG^Negative mg/dL    Urobilinogen mg/dL 2.0 0.0 - 2.0 mg/dL    Nitrite Urine Negative NEG^Negative     Leukocyte Esterase Urine Trace (A) NEG^Negative    Source Midstream Urine     WBC Urine 3 0 - 5 /HPF    RBC Urine 1 0 - 2 /HPF    Squamous Epithelial /HPF Urine 5 (H) 0 - 1 /HPF    Transitional Epi <1 0 - 1 /HPF    Mucous Urine Present (A) NEG^Negative /LPF   Drug abuse screen 6 urine (chem dep)     Status: Abnormal    Collection Time: 03/13/18  8:16 AM   Result Value Ref Range    Amphetamine Qual Urine Negative NEG^Negative    Barbiturates Qual Urine Negative NEG^Negative    Benzodiazepine Qual Urine Negative NEG^Negative    Cannabinoids Qual Urine Positive (A) NEG^Negative    Cocaine Qual Urine Negative NEG^Negative    Ethanol Qual Urine Negative NEG^Negative    Opiates Qualitative Urine Negative NEG^Negative           Labs Ordered and Resulted from Time of ED Arrival Up to the Time of Departure from the ED   CBC WITH PLATELETS DIFFERENTIAL - Abnormal; Notable for the following:        Result Value    Hemoglobin 11.4 (*)     All other components within normal limits   VALPROIC ACID - Abnormal; Notable for the following:     Valproic Acid Level <3 (*)     All other components within normal limits   ROUTINE UA WITH MICROSCOPIC - Abnormal; Notable for the following:     pH Urine 7.5 (*)     Protein Albumin Urine 10 (*)     Leukocyte Esterase Urine Trace (*)     Squamous Epithelial /HPF Urine 5 (*)     Mucous Urine Present (*)     All other components within normal limits   DRUG ABUSE SCREEN 6 CHEM DEP URINE ([facility]) - Abnormal; Notable for the following:     Cannabinoids Qual Urine Positive (*)     All other components within normal limits   HCG QUAL URINE POCT - Normal   COMPREHENSIVE METABOLIC PANEL   CRP INFLAMMATION   LIPASE   NT PROBNP INPATIENT   TROPONIN I   CARDIAC CONTINUOUS MONITORING   PATIENT CARE ORDER            Assessments & Plan (with Medical Decision Making)  Epigastric pain unclear etiology but EKG and trop, other labs all good, mild improvements with zantac, tolerating po well, will DC with zantac  and follow up with her PMD in 1-2 weeks.  Seizure, off depakote for two weeks ?insurance issue, re-loaded today nad SW to see for insurance issue, Rx also written today and follow up with her Neurology at [facility].       I have reviewed the nursing notes.    I have reviewed the findings, diagnosis, plan and need for follow up with the patient.    Discharge Medication List as of 3/13/2018 12:20 PM      START taking these medications    Details   ranitidine (ZANTAC) 150 MG tablet Take 1 tablet (150 mg) by mouth 2 times daily for 15 days, Disp-30 tablet, R-0, Local Print             Final diagnoses:   Abdominal pain, epigastric   Seizure disorder (H)       3/13/2018   [facility], [place], EMERGENCY DEPARTMENT     Paul Castillo MD  03/13/18 8531

## 2018-07-02 ENCOUNTER — HOSPITAL ENCOUNTER (EMERGENCY)
Facility: CLINIC | Age: 33
Discharge: HOME | End: 2018-07-02
Attending: EMERGENCY MEDICINE | Admitting: EMERGENCY MEDICINE

## 2018-07-02 ENCOUNTER — APPOINTMENT (OUTPATIENT)
Dept: GENERAL RADIOLOGY | Facility: CLINIC | Age: 33
End: 2018-07-02
Attending: EMERGENCY MEDICINE

## 2018-07-02 VITALS
OXYGEN SATURATION: 98 % | WEIGHT: 138 LBS | SYSTOLIC BLOOD PRESSURE: 115 MMHG | BODY MASS INDEX: 22.18 KG/M2 | HEART RATE: 74 BPM | RESPIRATION RATE: 14 BRPM | DIASTOLIC BLOOD PRESSURE: 63 MMHG | TEMPERATURE: 99.1 F | HEIGHT: 66 IN

## 2018-07-02 DIAGNOSIS — J06.9 VIRAL URI WITH COUGH: ICD-10-CM

## 2018-07-02 DIAGNOSIS — R07.9 CHEST PAIN, UNSPECIFIED TYPE: ICD-10-CM

## 2018-07-02 LAB — INTERPRETATION ECG - MUSE: NORMAL

## 2018-07-02 PROCEDURE — 99284 EMERGENCY DEPT VISIT MOD MDM: CPT | Mod: 25 | Performed by: EMERGENCY MEDICINE

## 2018-07-02 PROCEDURE — 93005 ELECTROCARDIOGRAM TRACING: CPT | Performed by: EMERGENCY MEDICINE

## 2018-07-02 PROCEDURE — 93010 ELECTROCARDIOGRAM REPORT: CPT | Mod: Z6 | Performed by: EMERGENCY MEDICINE

## 2018-07-02 PROCEDURE — 99285 EMERGENCY DEPT VISIT HI MDM: CPT | Mod: 25 | Performed by: EMERGENCY MEDICINE

## 2018-07-02 PROCEDURE — 71046 X-RAY EXAM CHEST 2 VIEWS: CPT

## 2018-07-02 RX ORDER — OXYMETAZOLINE HYDROCHLORIDE 0.05 G/100ML
2 SPRAY NASAL 2 TIMES DAILY
Qty: 1 BOTTLE | Refills: 0 | Status: SHIPPED | OUTPATIENT
Start: 2018-07-02 | End: 2018-07-05

## 2018-07-02 RX ORDER — IBUPROFEN 200 MG
400 TABLET ORAL EVERY 8 HOURS PRN
Qty: 30 TABLET | Refills: 0 | Status: SHIPPED | OUTPATIENT
Start: 2018-07-02

## 2018-07-02 RX ORDER — BENZONATATE 100 MG/1
100-200 CAPSULE ORAL 3 TIMES DAILY PRN
Qty: 42 CAPSULE | Refills: 0 | Status: SHIPPED | OUTPATIENT
Start: 2018-07-02

## 2018-07-02 ASSESSMENT — ENCOUNTER SYMPTOMS
ABDOMINAL PAIN: 0
FEVER: 0
SINUS PRESSURE: 1
SORE THROAT: 1
RHINORRHEA: 1
COUGH: 1
SHORTNESS OF BREATH: 1

## 2018-07-02 NOTE — ED AVS SNAPSHOT
[facility], [place], Emergency Department    [address] [place] [address]    Phone:  [phone]                                       Roxana Brewster   MRN: [id number]    Department:  [facility], Emergency Department   Date of Visit:  7/2/2018           After Visit Summary Signature Page     I have received my discharge instructions, and my questions have been answered. I have discussed any challenges I see with this plan with the nurse or doctor.    ..........................................................................................................................................  Patient/Patient Representative Signature      ..........................................................................................................................................  Patient Representative Print Name and Relationship to Patient    ..................................................               ................................................  Date                                            Time    ..........................................................................................................................................  Reviewed by Signature/Title    ...................................................              ..............................................  Date                                                            Time

## 2018-07-02 NOTE — ED AVS SNAPSHOT
[facility], Emergency Department    [address] [address]    Phone:  [phone]                                       Roxana Brewster   MRN: [id number]    Department:  [facility], Emergency Department   Date of Visit:  7/2/2018           Patient Information     Date Of Birth          1985        Your diagnoses for this visit were:     Viral URI with cough     Chest pain, unspecified type        You were seen by Coni Almaraz MD.        Discharge Instructions       Please make an appointment to follow up with Your Primary Care Provider in 10 days if not improving. Return to the ER with new or worsening symptoms.       Discharge References/Attachments     URI, VIRAL, NO ABX (ADULT) (ENGLISH)      24 Hour Appointment Hotline       To make an appointment at any [facility] clinic, call [phone] ([phone]). If you don't have a family doctor or clinic, we will help you find one. [facility] clinics are conveniently located to serve the needs of you and your family.             Review of your medicines      START taking        Dose / Directions Last dose taken    benzonatate 100 MG capsule   Commonly known as:  TESSALON   Dose:  100-200 mg   Quantity:  42 capsule        Take 1-2 capsules (100-200 mg) by mouth 3 times daily as needed for cough   Refills:  0        ibuprofen 200 MG tablet   Commonly known as:  ADVIL/MOTRIN   Dose:  400 mg   Quantity:  30 tablet        Take 2 tablets (400 mg) by mouth every 8 hours as needed for pain   Refills:  0        oxymetazoline 0.05 % spray   Commonly known as:  AFRIN NASAL SPRAY   Dose:  2 spray   Quantity:  1 Bottle        Spray 2 sprays in nostril 2 times daily for 3 days   Refills:  0          Our records show that you are taking the medicines listed below. If these are incorrect, please call your family doctor or clinic.        Dose / Directions Last dose taken    butalbital-acetaminophen-caffeine -40 MG per tablet   Commonly  known as:  FIORICET/ESGIC   Dose:  1 tablet   Quantity:  28 tablet        Take 1 tablet by mouth every 4 hours as needed   Refills:  0        divalproex sodium extended-release 500 MG 24 hr tablet   Commonly known as:  DEPAKOTE ER   Dose:  1000 mg   Quantity:  30 tablet        Take 2 tablets (1,000 mg) by mouth daily   Refills:  0        HYDROcodone-acetaminophen 5-325 MG per tablet   Commonly known as:  NORCO   Dose:  1-2 tablet   Quantity:  5 tablet        Take 1-2 tablets by mouth every 4 hours as needed   Refills:  0        ondansetron 4 MG ODT tab   Commonly known as:  ZOFRAN ODT   Dose:  4-8 mg   Quantity:  10 tablet        Take 1-2 tablets (4-8 mg) by mouth every 8 hours as needed for nausea   Refills:  0                Prescriptions were sent or printed at these locations (3 Prescriptions)                   Other Prescriptions                Printed at Department/Unit printer (3 of 3)         benzonatate (TESSALON) 100 MG capsule               ibuprofen (ADVIL/MOTRIN) 200 MG tablet               oxymetazoline (AFRIN NASAL SPRAY) 0.05 % spray                Procedures and tests performed during your visit     Chest XR,  PA & LAT    EKG 12 lead      Orders Needing Specimen Collection     None      Pending Results     Date and Time Order Name Status Description    7/2/2018 0602 Chest XR,  PA & LAT Preliminary     7/2/2018 0548 EKG 12 lead Preliminary             Pending Culture Results     No orders found from 6/30/2018 to 7/3/2018.            Pending Results Instructions     If you had any lab results that were not finalized at the time of your Discharge, you can call the ED Lab Result RN at [phone]. You will be contacted by this team for any positive Lab results or changes in treatment. The nurses are available 7 days a week from 10A to 6:30P.  You can leave a message 24 hours per day and they will return your call.        Thank you for choosing [facility]       Thank you for choosing [facility] for your  "care. Our goal is always to provide you with excellent care. Hearing back from our patients is one way we can continue to improve our services. Please take a few minutes to complete the written survey that you may receive in the mail after you visit with us. Thank you!        [employer] Information     [employer] lets you send messages to your doctor, view your test results, renew your prescriptions, schedule appointments and more. To sign up, go to [email or web address] . Click on \"Log in\" on the left side of the screen, which will take you to the Welcome page. Then click on \"Sign up Now\" on the right side of the page.     You will be asked to enter the access code listed below, as well as some personal information. Please follow the directions to create your username and password.     Your access code is: [email or web address]  Expires: 2018  6:35 AM     Your access code will  in 90 days. If you need help or a new code, please call your [facility] clinic or [phone].        Care EveryWhere ID     This is your Care EveryWhere ID. This could be used by other organizations to access your [facility] medical records  [id number]        Equal Access to Services     TAYA PRASAD : Ryan Busch, wahuey crook, qadebby kaalmadelaney coon, aaron novak. So [facility] [phone].    ATENCIÓN: Si habla español, tiene a navas disposición servicios gratuitos de asistencia lingüística. Llame al [phone].    We comply with applicable federal civil rights laws and [place] laws. We do not discriminate on the basis of race, color, national origin, age, disability, sex, sexual orientation, or gender identity.            After Visit Summary       This is your record. Keep this with you and show to your community pharmacist(s) and doctor(s) at your next visit.                  "

## 2018-07-02 NOTE — ED PROVIDER NOTES
"  History     Chief Complaint   Patient presents with     Cold Symptoms     Shortness of Breath     HPI  Roxana Brewster is a 33 year old female who presents to the emergency department with a complaint of 2 days of stuffy nose, sore throat, dry cough, and chest soreness with coughing.  No fevers.  Again, the soreness in the chest is primarily with coughing, is in the center of her chest.  No abdominal pain, nausea, vomiting, diarrhea, though at times she coughs so hard she feels she may gag and vomit.  No rash.  No recent travel.  She states that her sense of taste seems less since her nose has been stuffy.  She is a smoker.  She does have a history of mild asthma, states she tried her inhaler without much improvement.  No lower extremity pain or swelling. She denies chance of pregnancy.    Past Medical History:   Diagnosis Date     Migraines      Seizures (H)        Past Surgical History:   Procedure Laterality Date     DILATION AND CURETTAGE      one year ago       No family history on file.    Social History   Substance Use Topics     Smoking status: Current Some Day Smoker     Packs/day: 0.50     Smokeless tobacco: Never Used      Comment: quit about 1 month ago     Alcohol use Yes      Comment: 2 drinks/wk         I have reviewed the Medications, Allergies, Past Medical and Surgical History, and Social History in the Epic system.    Review of Systems   Constitutional: Negative for fever.   HENT: Positive for rhinorrhea, sinus pressure and sore throat.    Respiratory: Positive for cough and shortness of breath.    Cardiovascular: Positive for chest pain.   Gastrointestinal: Negative for abdominal pain.   All other systems reviewed and are negative.      Physical Exam   BP: 109/60  Pulse: 74  Temp: 99.1  F (37.3  C)  Resp: 14  Height: 167.6 cm (5' 6\")  Weight: 62.6 kg (138 lb)  SpO2: 99 %      Physical Exam   Constitutional: No distress.   HENT:   Head: Atraumatic.   Mouth/Throat: Oropharynx is clear and moist. " No oropharyngeal exudate.   Nasal congestion   Eyes: Pupils are equal, round, and reactive to light. No scleral icterus.   Cardiovascular: Normal heart sounds and intact distal pulses.    Pulmonary/Chest: Breath sounds normal. No respiratory distress.   Abdominal: Soft. There is no tenderness.   Musculoskeletal: She exhibits no edema or tenderness.   Skin: Skin is warm. No rash noted. She is not diaphoretic.       ED Course     ED Course     Procedures             EKG Interpretation:      Interpreted by Coni Almaraz  Time reviewed: 0555  Symptoms at time of EKG: cp   Rhythm: normal sinus   Rate: 73  Axis: Right Axis Deviation  Ectopy: premature atrial contraction  Conduction: normal  ST Segments/ T Waves: No ST-T wave changes  Q Waves: none  Comparison to prior: Unchanged    Clinical Impression: NSR, stable EKG                Critical Care time:  none             Labs Ordered and Resulted from Time of ED Arrival Up to the Time of Departure from the ED - No data to display         Assessments & Plan (with Medical Decision Making)   EKG was done and is not notably changed when compared to previous.  She certainly has infectious type symptoms, and I do think this is likely an infectious process.  Chest x-ray was negative for evidence of pneumonia.  I do think this is likely viral URI.  Other serious causes such as PE or dissection are far less likely.  I will give the patient prescriptions for symptomatic medications, including Tessalon Perles, Afrin nasal spray, ibuprofen.  She is instructed to follow-up the clinic doctor in 10 days if not improving, return to the ER if new or worsening concerns.  She verbalizes understanding, is agreeable to the plan.    Dictation Disclaimer: Some of this Note has been completed with voice-recognition dictation software. Although errors are generally corrected real-time, there is the potential for a rare error to be present in the completed chart.      I have reviewed the  nursing notes.    I have reviewed the findings, diagnosis, plan and need for follow up with the patient.    New Prescriptions    BENZONATATE (TESSALON) 100 MG CAPSULE    Take 1-2 capsules (100-200 mg) by mouth 3 times daily as needed for cough    IBUPROFEN (ADVIL/MOTRIN) 200 MG TABLET    Take 2 tablets (400 mg) by mouth every 8 hours as needed for pain    OXYMETAZOLINE (AFRIN NASAL SPRAY) 0.05 % SPRAY    Spray 2 sprays in nostril 2 times daily for 3 days       Final diagnoses:   Viral URI with cough   Chest pain, unspecified type       7/2/2018   [facility], [place], EMERGENCY DEPARTMENT     Coni Almaraz MD  07/03/18 0452

## 2018-07-02 NOTE — ED TRIAGE NOTES
Pt arrives ambulatory with complaints of chest pain/shortness of breath with cold symptoms including cough, runny nose, and sore throat. Pt notes hx of asthma, occasional use of inhalers.

## 2018-07-02 NOTE — DISCHARGE INSTRUCTIONS
Please make an appointment to follow up with Your Primary Care Provider in 10 days if not improving. Return to the ER with new or worsening symptoms.

## 2019-02-07 ENCOUNTER — HOSPITAL ENCOUNTER (EMERGENCY)
Facility: CLINIC | Age: 34
Discharge: HOME | End: 2019-02-07
Attending: EMERGENCY MEDICINE | Admitting: EMERGENCY MEDICINE

## 2019-02-07 VITALS
WEIGHT: 143.08 LBS | DIASTOLIC BLOOD PRESSURE: 74 MMHG | RESPIRATION RATE: 16 BRPM | BODY MASS INDEX: 23.09 KG/M2 | OXYGEN SATURATION: 97 % | HEART RATE: 85 BPM | SYSTOLIC BLOOD PRESSURE: 118 MMHG | TEMPERATURE: 98.2 F

## 2019-02-07 DIAGNOSIS — Z33.1 PREGNANCY, INCIDENTAL: ICD-10-CM

## 2019-02-07 DIAGNOSIS — Z32.01 PREGNANCY EXAMINATION OR TEST, POSITIVE RESULT: ICD-10-CM

## 2019-02-07 PROCEDURE — 99281 EMR DPT VST MAYX REQ PHY/QHP: CPT

## 2019-02-07 PROCEDURE — 99282 EMERGENCY DEPT VISIT SF MDM: CPT | Mod: Z6 | Performed by: EMERGENCY MEDICINE

## 2019-02-07 NOTE — DISCHARGE INSTRUCTIONS
.Please call to make an appointment to follow up with OB/Gyn--[facility] (phone: [phone]) and OB/Gyn--[facility] (phone: [phone]) for further evaluation and prenatal care.     Please return to the ED if you develop severe abdominal pain, high fever, persistent vomiting, or any worsening of your symptoms.

## 2019-02-07 NOTE — ED PROVIDER NOTES
History     Chief Complaint   Patient presents with     Confirmation Of Pregnancy     HPI  Roxana Brewster is a 34 year old  female who presents emergency department with her significant other with a complaint of pregnancy.  Patient reports that her last menstrual period was on 2018.  Patient states that last Thursday she took a home pregnancy test which was positive.  Patient states that she came in today to get checked out for her prenatal visit and to find out her dates.  Patient complains of some mild nausea, mild intermittent lower abdominal cramping and that her breasts have been hurting over the past few weeks consistent with her pregnancy diagnosis.  Patient denies any fever, chills,  vomiting, chest pain, shortness of breath.  Patient denies any vaginal discharge, vaginal bleeding, dysuria, hematuria.  No other complaints.  Patient states she does not have a primary care physician or OB/GYN.    I have reviewed the Medications, Allergies, Past Medical and Surgical History, and Social History in the Epic system.    Review of Systems   Gastrointestinal: Positive for nausea.   Genitourinary:        Pregnancy   All other systems reviewed and are negative.      Physical Exam   BP: 118/74  Pulse: 85  Heart Rate: 85  Temp: 98.2  F (36.8  C)  Resp: 16  Weight: 64.9 kg (143 lb 1.3 oz)  SpO2: 97 %      Physical Exam   Constitutional: She is oriented to person, place, and time. She appears well-developed. No distress.   HENT:   Head: Normocephalic and atraumatic.   Right Ear: External ear normal.   Left Ear: External ear normal.   Mouth/Throat: Oropharynx is clear and moist.   Eyes: Conjunctivae and EOM are normal. Pupils are equal, round, and reactive to light.   Neck: Normal range of motion. Neck supple.   Cardiovascular: Normal rate, regular rhythm and normal heart sounds.   Pulmonary/Chest: Effort normal and breath sounds normal. No respiratory distress. She has no wheezes. She has no rales.    Abdominal: Soft. She exhibits no distension. There is no tenderness. There is no rebound and no guarding.   Musculoskeletal: Normal range of motion. She exhibits no tenderness or deformity.   Neurological: She is alert and oriented to person, place, and time. No cranial nerve deficit. Coordination normal.   Skin: Skin is warm and dry. No rash noted.   Psychiatric: She has a normal mood and affect. Her behavior is normal.       ED Course        Procedures          Labs Ordered and Resulted from Time of ED Arrival Up to the Time of Departure from the ED - No data to display         Assessments & Plan (with Medical Decision Making)   Roxana Brewster is a 34 year old  female who presents emergency department with her significant other with a complaint of pregnancy upon arrival patient is well-appearing, afebrile, no distress.  Patient is hemodynamically stable.  Patient reports her last menstrual period was.  2018, patient reports home positive pregnancy test and is here to get her prenatal care started.  Patient denies any specific complaints.  Patient does not have a primary care physician or OB/GYN.  At this time I discussed with patient and her significant other, patient currently pregnant approximately 1-2 months.  Will have patient follow-up in clinic with OB/GYN to establish prenatal care and ongoing pregnancy care.  Patient and significant other understand and agree with this plan.  Referral was given.  Return precautions discussed if high fever, persistent vomiting, severe abdominal pain, vaginal bleeding, or any worsening symptoms. .The patient is discharged home with instructions to return if their symptoms persist or worsen.  Plan for close follow-up with their primary physician.  I discussed workup, results, treatment, and plan with the patient.  Patient understands and agrees with the plan.    I have reviewed the nursing notes.    I have reviewed the findings, diagnosis, plan and need  for follow up with the patient.        Final diagnoses:   Pregnancy, incidental       2/7/2019   [facility], [place], EMERGENCY DEPARTMENT     Jessie Hein MD  02/10/19 1701

## 2019-02-07 NOTE — ED TRIAGE NOTES
"PT IS HERE BECAUSE SHE ESTIMATES SHE IS ABOUT 2 MONTHS PREGNANT. She has not seen a doctor for this. She is here to \"get all that checked out and stuff\". She does not have a primary care physician. Her LMP was 12/23. She is not having any unusual symptoms other than nausea  "

## 2019-02-10 ASSESSMENT — ENCOUNTER SYMPTOMS: NAUSEA: 1

## 2019-05-21 NOTE — ED AVS SNAPSHOT
[facility], [place], Emergency Department  [address] [place] [id number]  Phone:  [phone]                                    Roxana Brewster   MRN: [id number]    Department:  [facility], Emergency Department   Date of Visit:  2/7/2019           After Visit Summary Signature Page    I have received my discharge instructions, and my questions have been answered. I have discussed any challenges I see with this plan with the nurse or doctor.    ..........................................................................................................................................  Patient/Patient Representative Signature      ..........................................................................................................................................  Patient Representative Print Name and Relationship to Patient    ..................................................               ................................................  Date                                   Time    ..........................................................................................................................................  Reviewed by Signature/Title    ...................................................              ..............................................  Date                                               Time          22EPIC Rev 08/18       
no